# Patient Record
Sex: FEMALE | Race: AMERICAN INDIAN OR ALASKA NATIVE | NOT HISPANIC OR LATINO | URBAN - METROPOLITAN AREA
[De-identification: names, ages, dates, MRNs, and addresses within clinical notes are randomized per-mention and may not be internally consistent; named-entity substitution may affect disease eponyms.]

---

## 2023-09-14 ENCOUNTER — APPOINTMENT (RX ONLY)
Dept: URBAN - METROPOLITAN AREA CLINIC 107 | Facility: CLINIC | Age: 43
Setting detail: DERMATOLOGY
End: 2023-09-14

## 2023-09-14 DIAGNOSIS — L72.8 OTHER FOLLICULAR CYSTS OF THE SKIN AND SUBCUTANEOUS TISSUE: ICD-10-CM

## 2023-09-14 PROCEDURE — 99202 OFFICE O/P NEW SF 15 MIN: CPT

## 2023-09-14 PROCEDURE — ? COUNSELING

## 2023-09-14 PROCEDURE — ? DEFER

## 2023-09-14 ASSESSMENT — LOCATION ZONE DERM: LOCATION ZONE: LEG

## 2023-09-14 ASSESSMENT — LOCATION DETAILED DESCRIPTION DERM: LOCATION DETAILED: LEFT MEDIAL POSTERIOR THIGH

## 2023-09-14 ASSESSMENT — LOCATION SIMPLE DESCRIPTION DERM: LOCATION SIMPLE: LEFT INNER THIGH

## 2023-09-14 NOTE — PROCEDURE: DEFER
Introduction Text (Please End With A Colon): The following procedure was deferred:
Detail Level: Detailed
Size Of Lesion In Cm (Optional): 1.2
Procedure To Be Performed At Next Visit: Punch Excision
X Size Of Lesion In Cm (Optional): 0

## 2023-09-15 ENCOUNTER — APPOINTMENT (RX ONLY)
Dept: URBAN - METROPOLITAN AREA CLINIC 107 | Facility: CLINIC | Age: 43
Setting detail: DERMATOLOGY
End: 2023-09-15

## 2023-09-15 DIAGNOSIS — L72.8 OTHER FOLLICULAR CYSTS OF THE SKIN AND SUBCUTANEOUS TISSUE: ICD-10-CM

## 2023-09-15 PROCEDURE — 11401 EXC TR-EXT B9+MARG 0.6-1 CM: CPT

## 2023-09-15 PROCEDURE — ? EXCISION

## 2023-09-15 ASSESSMENT — LOCATION ZONE DERM: LOCATION ZONE: LEG

## 2023-09-15 ASSESSMENT — LOCATION DETAILED DESCRIPTION DERM: LOCATION DETAILED: LEFT INGUINAL FOLD

## 2023-09-15 ASSESSMENT — LOCATION SIMPLE DESCRIPTION DERM: LOCATION SIMPLE: LEFT INGUINAL FOLD

## 2023-09-15 NOTE — PROCEDURE: EXCISION

## 2023-09-15 NOTE — PROCEDURE: MIPS QUALITY
Quality 431: Preventive Care And Screening: Unhealthy Alcohol Use - Screening: Patient not identified as an unhealthy alcohol user when screened for unhealthy alcohol use using a systematic screening method
Your evaluated in the emergency room for your symptoms.  Your labs were significant for mild elevation of your creatinine.  This is a marker of kidney function.  Looking through your previous records your baseline is around 1.2, today was slightly elevated at around 1.5.  Your white blood cell count was also slightly low, given your history of lymphoma and prostate cancer it is important to follow-up with your primary doctor regarding this.  If you develop fevers you should return to the emergency room immediately.  Your lipase which is a pancreas enzyme was slightly elevated as well, however there was no evidence of inflammation of your pancreas on the CT scan.  you did have some postoperative seromas but no evidence of acute pathology or infection.  The leading explanation for your symptoms is allergic reaction.  I am giving you some steroids to try to prevent a possible biphasic reaction which is where your symptoms return.  However if they do feel free to come back to the emergency room immediately.  I am also giving you an EpiPen.  If you have return of your symptoms feel free to use the EpiPen I am giving a prescription for.  It is especially important to use it if you feel like you cannot breathe or you feel very lightheaded and flushed.    Please follow-up with your primary care doctor the indicated amount of time.  If you do not have a primary care doctor we are providing you one, however if you do have a primary care doctor already please feel free to follow up with that primary care doctor.    It is still very important to follow-up with your primary care doctor because there many non-emergent things that can still cause many problems if left unaddressed.    Please return to the emergency room immediately if you develop worsening chest pain, shortness of breath, abdominal pain, nausea and vomiting that is intractable, fevers, confusion, or any other concerning symptoms.  Please note that you can return to 
Detail Level: Detailed
the emergency room at any time if you have any concerns at all.    
Quality 226: Preventive Care And Screening: Tobacco Use: Screening And Cessation Intervention: Patient screened for tobacco use and is an ex/non-smoker